# Patient Record
Sex: FEMALE | Race: WHITE
[De-identification: names, ages, dates, MRNs, and addresses within clinical notes are randomized per-mention and may not be internally consistent; named-entity substitution may affect disease eponyms.]

---

## 2020-10-20 ENCOUNTER — HOSPITAL ENCOUNTER (OUTPATIENT)
Dept: HOSPITAL 76 - EMS | Age: 80
Discharge: ANOTHER HOSPITAL | End: 2020-10-20
Attending: SURGERY
Payer: MEDICARE

## 2020-10-20 ENCOUNTER — HOSPITAL ENCOUNTER (EMERGENCY)
Dept: HOSPITAL 76 - ED | Age: 80
Discharge: HOME | End: 2020-10-20
Payer: MEDICARE

## 2020-10-20 VITALS — SYSTOLIC BLOOD PRESSURE: 170 MMHG | DIASTOLIC BLOOD PRESSURE: 100 MMHG

## 2020-10-20 DIAGNOSIS — S09.90XA: Primary | ICD-10-CM

## 2020-10-20 DIAGNOSIS — S00.31XA: Primary | ICD-10-CM
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
## 2020-10-20 DIAGNOSIS — S50.12XA: ICD-10-CM
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
## 2020-10-20 DIAGNOSIS — S00.33XA: ICD-10-CM

## 2020-10-20 PROCEDURE — 99283 EMERGENCY DEPT VISIT LOW MDM: CPT

## 2020-10-20 PROCEDURE — 99282 EMERGENCY DEPT VISIT SF MDM: CPT

## 2020-10-20 NOTE — ED PHYSICIAN DOCUMENTATION
PD HPI Fall





- Stated complaint


Stated Complaint: GLF





- History obtained from


History obtained from: Patient, EMS





- History of Present Illness


Mechanism of injury: Tripped (She states she tripped on the edge of a small 

brick that was lifted in the walkway.  She fell forward onto her hands and knees

and did strike her nose.  She denies forceful impact of the head nor any chest 

or abdomen injury.  She was helped up slowly.  EMS was called and convinced her 

to come to ER.)


Fall distance: Standing position


Where injury occurred: Street


Timing - onset: Today (just PTA)


Injury(ies) location: Face, Left Uppper Extremity (forearm bruising).  No: Head,

Neck, Chest, Abdomen


Associated symptoms: No: LOC, AMS, Neck pain, Weakness


Contributing factors: No: Anticoagulated, Intoxicated


Similar symptoms before: Has not had sx before


Recently seen: Not recently seen





Review of Systems


Constitutional: denies: Fever


Nose: reports: Epistaxis (briefly).  denies: Rhinorrhea / runny nose, Congestion


Throat: denies: Sore throat


Cardiac: denies: Chest pain / pressure


Respiratory: denies: Cough


GI: denies: Abdominal Pain


Skin: reports: Abrasion (s) (bridge of nose)


Neurologic: denies: Focal weakness, Numbness, Altered mental status, Headache





PD PAST MEDICAL HISTORY





- Past Medical History


Cardiovascular: None


Neuro: None





- Allergies


Allergies/Adverse Reactions: 


                                    Allergies











Allergy/AdvReac Type Severity Reaction Status Date / Time


 


No Known Drug Allergies Allergy   Verified 10/20/20 13:20














- Living Situation


Living Situation: reports: Alone


Living Arrangement: reports: At home, Other (usually uses cane to assist 

ambulation)





- Social History


Does the pt smoke?: No


Does the pt drink ETOH?: No


Does the pt have substance abuse?: No





- Family History


Family history: reports: Non contributory





PD ED PE NORMAL





- Vitals


Vital signs reviewed: Yes





- General


General: Alert and oriented X 3, Well developed/nourished





- HEENT


HEENT: PERRL, EOMI, Dentition benign, Other (The bridge of the nose has an 

abrasion half centimeter in size.  No laceration per se.  No foreign body.  The 

nose itself has some mild tenderness and swelling on the bridge.  No lateral 

bony tenderness no deformity.  No septal hematoma.  No active epistaxis.)





- Neck


Neck: Supple, no meningeal sign, No bony TTP, No adenopathy





- Cardiac


Cardiac: RRR, No murmur





- Respiratory


Respiratory: Clear bilaterally, Other (no chestwall tenderness)





- Abdomen


Abdomen: Soft, Non tender





- Derm


Derm: Normal color, Warm and dry





- Extremities


Extremities: Normal ROM s pain, Other (She has some bruising on the ulnar aspect

of the mid forearm on the left.  No bony tenderness.  She has full range of 

motion of the wrist and elbow and strong  without any pain.)





- Neuro


Neuro: Alert and oriented X 3, CNs 2-12 intact, No motor deficit, Normal speech


Eye Opening: Spontaneous


Motor: Obeys Commands


Verbal: Oriented


GCS Score: 15





- Psych


Psych: Normal mood





Results





- Vitals


Vitals: 


                               Vital Signs - 24 hr











  10/20/20 10/20/20





  13:20 14:43


 


Temperature 37.0 C 36.6 C


 


Heart Rate 65 72


 


Respiratory 16 16





Rate  


 


Blood Pressure 153/105 H 170/100 H


 


O2 Saturation 100 94








                                     Oxygen











O2 Source                      Room air

















PD MEDICAL DECISION MAKING





- ED course


Complexity details: considered differential (She has an abrasion on the bridge 

of the nose with mild swelling but no deformity and no real bony tenderness per 

se.  No epistaxis.  She has a bruising on the left forearm but good range of 

motion and .  She is not on blood thinners and has no concussive symptoms. 

), d/w patient


ED course: 





She seems minimally injured and is not on blood thinners.  Shared decision is to

not do any imaging at this time.





Departure





- Departure


Disposition: 01 Home, Self Care


Clinical Impression: 


Fall from slip, trip, or stumble


Qualifiers:


 Encounter type: initial encounter Qualified Code(s): W01.0XXA - Fall on same 

level from slipping, tripping and stumbling without subsequent striking against 

object, initial encounter





Nasal contusion


Qualifiers:


 Encounter type: initial encounter Qualified Code(s): S00.33XA - Contusion of 

nose, initial encounter





Forearm contusion


Qualifiers:


 Encounter type: initial encounter Laterality: left Qualified Code(s): S50.12XA 

- Contusion of left forearm, initial encounter





Laceration of nose


Qualifiers:


 Encounter type: initial encounter Qualified Code(s): S01.21XA - Laceration 

without foreign body of nose, initial encounter





Condition: Stable


Record reviewed to determine appropriate education?: Yes


Instructions:  ED Abrasion, ED Contusion Upper Ext


Follow-Up: 


[facility] [Provider Group]


Comments: 


Cleanse the facial abrasions once or twice daily with soap and water and apply a

light bit of ointment.  Recheck if signs of infection.  Tylenol or ibuprofen as 

needed for pains.  Activity as tolerated with the forearm.





I do not get a sense of any fractures at this time.  It is possible to have a 

nondisplaced nasal fracture and that would not necessarily need any particular 

treatment unless you have some deformity or difficulty breathing after the 

swelling goes down.





Follow-up with your primary care or ENT specialist if any problems with regular 

breathing through the nostrils or other concerns.


Discharge Date/Time: 10/20/20 14:43

## 2021-12-15 ENCOUNTER — HOSPITAL ENCOUNTER (EMERGENCY)
Dept: HOSPITAL 76 - ED | Age: 81
End: 2021-12-15
Payer: MEDICARE

## 2021-12-15 ENCOUNTER — HOSPITAL ENCOUNTER (OUTPATIENT)
Dept: HOSPITAL 76 - EMS | Age: 81
Discharge: ANOTHER HOSPITAL | End: 2021-12-15
Payer: MEDICARE

## 2021-12-15 DIAGNOSIS — I46.9: Primary | ICD-10-CM

## 2021-12-15 PROCEDURE — 99285 EMERGENCY DEPT VISIT HI MDM: CPT

## 2021-12-15 PROCEDURE — 99283 EMERGENCY DEPT VISIT LOW MDM: CPT

## 2021-12-15 NOTE — ED PHYSICIAN DOCUMENTATION
History of Present Illness





- Stated complaint


Stated Complaint: CPR





- Chief complaint


Chief Complaint: Critical Care





- History obtained from


History obtained from: EMS





- History of Present Illness


Timing: Today


Pain level max: 0


Pain level now: 0





- Additonal information


Additional information: 





EMS states patient was found in cardiac arrest by friends outside her house at 

about 1650. CPR ongoing for over 1 hour. No ROSC.  No other history available.





Review of Systems


Unable to obtain: Unresponsive





PD PAST MEDICAL HISTORY





- Past Medical History


Past Medical History: No


Cardiovascular: None


Neuro: None





- Past Surgical History


Past Surgical History: Yes





- Allergies


Allergies/Adverse Reactions: 


                                    Allergies











Allergy/AdvReac Type Severity Reaction Status Date / Time


 


No Known Drug Allergies Allergy   Verified 10/20/20 13:20














- Social History


Does the pt smoke?: No


Smoking Status: Never smoker


Does the pt drink ETOH?: No


Does the pt have substance abuse?: No





- Immunizations


Immunizations are current?: Yes





- POLST


Patient has POLST: No





PD ED PE NORMAL





- Vitals


Vital signs reviewed: Yes





- General


General: Other (unresponsive)





- HEENT


HEENT: Other (pupils fixed and dilated, blood on the face, broken teeth. ETT in 

place.)





- Neck


Neck: Other (in c-collar)





- Cardiac


Cardiac: Other (absent heart sounds. )





- Respiratory


Respiratory: Other (absent breath sound)





- Abdomen


Abdomen: Other (soft)





- Derm


Derm: Other (cold, pale)





- Extremities


Extremities: No deformity





- Neuro


Neuro: Other (unresponsive.)





Results





- Vitals


Vitals: 


                               Vital Signs - 24 hr











  12/15/21





  18:05


 


Temperature 36.4 C L


 


Heart Rate 0 L


 


Respiratory 0 L





Rate 


 


Blood Pressure 00/00 L


 


O2 Saturation 0 L








                                     Oxygen











O2 Source                      Room air

















PD MEDICAL DECISION MAKING





- ED course


Complexity details: considered differential, d/w family


ED course: 





cardiac standstill on ultrasound. PEA for over 1 hour with no ROSC.  time of 

death 1800. I did call the patient's family and inform them of the patient's 

death.








Departure





- Departure


Disposition: 20 


Clinical Impression: 


 Cardiac arrest





Discharge Date/Time: 12/15/21 21:16